# Patient Record
Sex: FEMALE | Race: WHITE
[De-identification: names, ages, dates, MRNs, and addresses within clinical notes are randomized per-mention and may not be internally consistent; named-entity substitution may affect disease eponyms.]

---

## 2019-08-18 ENCOUNTER — HOSPITAL ENCOUNTER (OUTPATIENT)
Dept: HOSPITAL 89 - ER | Age: 64
End: 2019-08-18
Attending: SURGERY
Payer: COMMERCIAL

## 2019-08-18 VITALS — DIASTOLIC BLOOD PRESSURE: 58 MMHG | SYSTOLIC BLOOD PRESSURE: 111 MMHG

## 2019-08-18 VITALS — DIASTOLIC BLOOD PRESSURE: 56 MMHG | SYSTOLIC BLOOD PRESSURE: 102 MMHG

## 2019-08-18 VITALS — DIASTOLIC BLOOD PRESSURE: 62 MMHG | SYSTOLIC BLOOD PRESSURE: 127 MMHG

## 2019-08-18 VITALS — SYSTOLIC BLOOD PRESSURE: 106 MMHG | DIASTOLIC BLOOD PRESSURE: 61 MMHG

## 2019-08-18 VITALS — DIASTOLIC BLOOD PRESSURE: 57 MMHG | SYSTOLIC BLOOD PRESSURE: 108 MMHG

## 2019-08-18 VITALS — SYSTOLIC BLOOD PRESSURE: 118 MMHG | DIASTOLIC BLOOD PRESSURE: 55 MMHG

## 2019-08-18 VITALS — SYSTOLIC BLOOD PRESSURE: 105 MMHG | DIASTOLIC BLOOD PRESSURE: 56 MMHG

## 2019-08-18 VITALS — SYSTOLIC BLOOD PRESSURE: 98 MMHG | DIASTOLIC BLOOD PRESSURE: 51 MMHG

## 2019-08-18 DIAGNOSIS — K80.10: Primary | ICD-10-CM

## 2019-08-18 LAB — PLATELET COUNT, AUTOMATED: 285 K/UL (ref 150–450)

## 2019-08-18 PROCEDURE — 82150 ASSAY OF AMYLASE: CPT

## 2019-08-18 PROCEDURE — 84075 ASSAY ALKALINE PHOSPHATASE: CPT

## 2019-08-18 PROCEDURE — 82947 ASSAY GLUCOSE BLOOD QUANT: CPT

## 2019-08-18 PROCEDURE — 74300 X-RAY BILE DUCTS/PANCREAS: CPT

## 2019-08-18 PROCEDURE — 84295 ASSAY OF SERUM SODIUM: CPT

## 2019-08-18 PROCEDURE — 84520 ASSAY OF UREA NITROGEN: CPT

## 2019-08-18 PROCEDURE — 86677 HELICOBACTER PYLORI ANTIBODY: CPT

## 2019-08-18 PROCEDURE — 82374 ASSAY BLOOD CARBON DIOXIDE: CPT

## 2019-08-18 PROCEDURE — 47563 LAPARO CHOLECYSTECTOMY/GRAPH: CPT

## 2019-08-18 PROCEDURE — 93005 ELECTROCARDIOGRAM TRACING: CPT

## 2019-08-18 PROCEDURE — 76705 ECHO EXAM OF ABDOMEN: CPT

## 2019-08-18 PROCEDURE — 84132 ASSAY OF SERUM POTASSIUM: CPT

## 2019-08-18 PROCEDURE — 82565 ASSAY OF CREATININE: CPT

## 2019-08-18 PROCEDURE — 83690 ASSAY OF LIPASE: CPT

## 2019-08-18 PROCEDURE — 82040 ASSAY OF SERUM ALBUMIN: CPT

## 2019-08-18 PROCEDURE — 88304 TISSUE EXAM BY PATHOLOGIST: CPT

## 2019-08-18 PROCEDURE — 81001 URINALYSIS AUTO W/SCOPE: CPT

## 2019-08-18 PROCEDURE — 84484 ASSAY OF TROPONIN QUANT: CPT

## 2019-08-18 PROCEDURE — 82310 ASSAY OF CALCIUM: CPT

## 2019-08-18 PROCEDURE — 84450 TRANSFERASE (AST) (SGOT): CPT

## 2019-08-18 PROCEDURE — 82435 ASSAY OF BLOOD CHLORIDE: CPT

## 2019-08-18 PROCEDURE — 82247 BILIRUBIN TOTAL: CPT

## 2019-08-18 PROCEDURE — 85025 COMPLETE CBC W/AUTO DIFF WBC: CPT

## 2019-08-18 PROCEDURE — 84155 ASSAY OF PROTEIN SERUM: CPT

## 2019-08-18 PROCEDURE — 74177 CT ABD & PELVIS W/CONTRAST: CPT

## 2019-08-18 PROCEDURE — 84460 ALANINE AMINO (ALT) (SGPT): CPT

## 2019-08-18 NOTE — POST OPERATIVE PROGRESS NOTE
Post Operative Progress Note


Date:  Aug 18, 2019


Time:  15:08


Surgeon:  


Ullrich





Dictation number:  [id number]


Anesthesia:  


GETA by Dr. Quiros


Pre-Op Diagnosis:  


Symptomatic cholelithiasis with unremitting biliary colic


Post-Op Diagnosis:  


STAN


Findings:  


C/W dx, normal IOC


Procedure(s):  


Lap angel with cholangiogram


Specimen Removed:(May be N/A):  


GB and contents


Complications:  


None


Fluids:  


See anesthesia record


Estimated Blood Loss:  


Minimal


Date OP Note Dictated:  Aug 18, 2019


Time OP Note Dictated:  15:09











ULLRICH,JOHN A MD              Aug 18, 2019 15:19

## 2019-08-18 NOTE — EKG
FACILITY: [facility] [place] 

 

PATIENT NAME: MILLICENT WILLIS

: [id number]

MR: [id number]

V: [id number]

EXAM DATE: 

ORDERING PHYSICIAN: SIDRA CONROY

TECHNOLOGIST: EDWINA

 

Test Reason : NAUSEA

Blood Pressure : ***/*** mmHG

Vent. Rate : 063 BPM     Atrial Rate : 063 BPM

   P-R Int : 172 ms          QRS Dur : 080 ms

    QT Int : 446 ms       P-R-T Axes : 011 050 027 degrees

   QTc Int : 456 ms

 

Normal sinus rhythm

Normal ECG

No previous ECGs available

Confirmed by ANIKA MCMULLEN ([id number]) on 2019 6:40:38 AM

 

Referred By:             Confirmed By:ANIKA MCMULLEN

## 2019-08-18 NOTE — ER REPORT
History and Physical


Time Seen By MD:  05:46


 (SIDRA CONROY DO)


HPI/ROS


CHIEF COMPLAINT: Abdominal pain





HISTORY OF PRESENT ILLNESS: 64-year-old female presents ambulatory to the ER 


complaining of severe epigastric pain, onset 1 hour prior to arrival.  Patient 


admits to vomiting several times on the way over in the car.  Patient status 


post vaginal hysterectomy.  Patient notes no dysuria, no frequency, no fever or 


chills.  No change in her bowel habits.  Patient reports she did have distant 


history of problems with her gallbladder and was advised to have her gallbladder


removed.  She describes it as shrunken and contracted.  She never did follow-up.


 She thinks his pain might be similar.





REVIEW OF SYSTEMS:


Respiratory: No cough, no dyspnea.


Cardiovascular: No chest pain, no palpitations.


Gastrointestinal: As above


Musculoskeletal: No back pain.


 (SIDRA CONROY DO)


Allergies:  


Coded Allergies:  


     No Known Drug Allergies (Verified , 8/6/10)


Home Meds


Active Scripts


Docusate Sodium (COLACE) 100 Mg Capsule, 1 CAP PO BID, #30 CAP 0 Refills


   TAKE WITH A FULL GLASS OF WATER


   Prov:ULLRICH,JOHN A MD         19


Tramadol Hcl (TRAMADOL HCL) 50 Mg Tablet, 1 TAB PO Q4H PRN for PAIN, #20 TAB 0 


Refills


   Prov:ULLRICH,JOHN A MD         19


Reviewed Nurses Notes:  Yes


Old Medical Records Reviewed:  Yes


 (SIDRA CONROY DO)


Hx Substance Use Disorder:  No


Hx Alcohol Use:  No


 (SIDRA CONROY DO)


Constitutional





Vital Sign - Last 24 Hours








 19





 05:53 05:55 06:00 06:19


 


Temp  98.1  


 


Pulse  71  57


 


Resp  18  


 


B/P (MAP) 137/79 (98) 137/79 112/62 (79) 


 


Pulse Ox  94  100


 


O2 Delivery  Room Air  


 


    





 19





 06:30 06:35 06:55 07:00


 


Pulse  75 64 


 


B/P (MAP) 125/78 (94)   118/69 (85)


 


Pulse Ox  100 91 





 19





 07:15 07:30 07:35 07:55


 


Pulse 62  63 64


 


B/P (MAP)  109/60 (76)  


 


Pulse Ox 97  97 96





 19   





 08:00   


 


B/P (MAP) 123/66 (85)   





 (COURTNEY ARRIAGA MD)


Physical Exam


  General Appearance: The patient is alert, has no immediate need for airway 


protection and no current signs of toxicity.  Vital signs stable, afebrile, 


pulse ox normal, cool, pale, dry skin


Eyes: Pupils equal and round no injection.


Respiratory: Chest is non tender, lungs are clear to auscultation.


Cardiac: regular rate and rhythm


Gastrointestinal: Abdomen is soft, mild epigastric and right upper quadrant 


tenderness, positive Falcon sign no masses, bowel sounds normal.


Musculoskeletal:  Neck: Neck is supple and non tender.


Extremities have full range of motion and are non tender.


Skin: No rashes or lesions.





DIFFERENTIAL DIAGNOSIS: After history and physical exam differential diagnosis 


was considered for abdominal pain including but not limited to appendicitis, 


cholecystitis, gastritis and urinary tract infection.


 (SIDRA CONROY DO)





Medical Decision Making


Data Points


Result Diagram:  


19 0600                                                                    


           19 0600





Laboratory





Hematology








Test


 19


06:00


 


White Blood Count


 5.2 k/uL


(4.5-11.0)


 


Red Blood Count


 4.96 M/uL


(4.17-5.56)


 


Hemoglobin


 14.6 g/dL


(12.0-16.0)


 


Hematocrit


 42.7 %


(34.0-47.0)


 


Mean Corpuscular Volume


 86.1 fL


(80.0-96.0)


 


Mean Corpuscular Hemoglobin


 29.5 pg


(26.0-33.0)


 


Mean Corpuscular Hemoglobin


Concent 34.3 g/dL


(32.0-36.0)


 


Red Cell Distribution Width


 13.6 %


(11.5-14.5)


 


Platelet Count


 285 K/uL


(150-450)


 


Mean Platelet Volume


 7.1 fL


(7.2-11.1)  L


 


Neutrophils (%) (Auto)


 52.4 %


(39.4-72.5)


 


Lymphocytes (%) (Auto)


 35.1 %


(17.6-49.6)


 


Monocytes (%) (Auto)


 6.7 %


(4.1-12.4)


 


Eosinophils (%) (Auto)


 4.6 %


(0.4-6.7)


 


Basophils (%) (Auto)


 1.2 %


(0.3-1.4)


 


Nucleated RBC Relative Count


(auto) 0.1 /100WBC  





 


Neutrophils # (Auto)


 2.7 K/uL


(2.0-7.4)


 


Lymphocytes # (Auto)


 1.8 K/uL


(1.3-3.6)


 


Monocytes # (Auto)


 0.3 K/uL


(0.3-1.0)


 


Eosinophils # (Auto)


 0.2 K/uL


(0.0-0.5)


 


Basophils # (Auto)


 0.1 K/uL


(0.0-0.1)


 


Nucleated RBC Absolute Count


(auto) 0.00 K/uL  











Chemistry








Test


 19


00:00 19


06:00


 


Troponin I < 0.012 ng/ml  


 


Sodium Level


 


 138 mmol/L


(137-145)


 


Potassium Level


 


 3.5 mmol/L


(3.5-5.0)


 


Chloride Level


 


 104 mmol/L


()


 


Carbon Dioxide Level


 


 24 mmol/L


(22-31)


 


Blood Urea Nitrogen


 


 13 mg/dl


(7-18)


 


Creatinine


 


 0.80 mg/dl


(0.52-1.04)


 


Glomerular Filtration Rate


Calc 


 > 60.0 





 


Random Glucose


 


 153 mg/dl


()


 


Calcium Level


 


 9.5 mg/dl


(8.4-10.2)


 


Total Bilirubin


 


 0.5 mg/dl


(0.2-1.3)


 


Aspartate Amino Transf


(AST/SGOT) 


 28 U/L (0-35) 





 


Alanine Aminotransferase


(ALT/SGPT) 


 43 U/L (0-56) 





 


Alkaline Phosphatase  71 U/L (0-126) 


 


Total Protein


 


 7.8 g/dl


(6.3-8.2)


 


Albumin


 


 4.3 g/dl


(3.5-5.0)


 


Amylase Level  72 U/L (0-110) 


 


Lipase


 


 122 U/L


()








Serology








Test


 19


00:00


 


Helicobacter pylori IgG


Antibody Negative


(NEGATIVE)








Urinalysis








Test


 19


05:52


 


Urine Color Yellow 


 


Urine Clarity Clear 


 


Urine pH


 5.0 pH


(4.8-9.5)


 


Urine Specific Gravity 1.014 


 


Urine Protein


 Negative mg/dL


(NEGATIVE)


 


Urine Glucose (UA)


 Negative mg/dL


(NEGATIVE)


 


Urine Ketones


 Negative mg/dL


(NEGATIVE)


 


Urine Blood


 Small


(NEGATIVE)


 


Urine Nitrite


 Negative


(NEGATIVE)


 


Urine Bilirubin


 Negative


(NEGATIVE)


 


Urine Urobilinogen


 Negative mg/dL


(0.2-1.9)


 


Urine Leukocyte Esterase


 Trace


(NEGATIVE)


 


Urine RBC


 1 /HPF


(0-2/HPF)


 


Urine WBC


 2 /HPF


(0-5/HPF)


 


Urine Squamous Epithelial


Cells Few /LPF


(</=FEW)


 


Urine Bacteria


 Negative /HPF


(NONE-FEW)


 


Urine Mucus


 Few /HPF


(NONE-FEW)





 (COURTNEY ARRIAGA MD)





EKG/Imaging


EKG Interpretation


12 lead EK


      Rhythm: normal sinus rhythm


      Axis: normal 


      QRS: normal


      ST segments: normal, no evidence of ischemia or dysrhythmia, no previous 


EKGs for comparison


      


 (SIDRA CONROY DO)


Imaging


FACILITY: [facility] 


 


PATIENT NAME: Isamar Plascencia


: 1955


MR: [id number]


V: [id number]


EXAM DATE: 


ORDERING PHYSICIAN: SIDRA CONROY


TECHNOLOGIST: 


 


Location: [facility] [place]


Patient: Isamar Plascencia


: 1955


MRN: [id number]


Visit/Account:[id number]


Date of Sevice:  2019


 


ACCESSION #: [id number]


 


GALLBLADDER


 


HISTORY:  Right upper quadrant pain


 


COMPARISON:  None.


 


FINDINGS:


 


Gallbladder: There are a few mobile shadowing gallstones. No gallbladder wall 


thickening. Positive sonographic Falcon's sign..


Bile ducts: There is no biliary ductal dilation with the CBD measuring 4 mm.


Liver: Negative.


Pancreas: Negative.


Right kidney: Negative.


Upper abdominal aorta and IVC: Patent.


Ascites: None visualized.


Other findings: None significant


 


IMPRESSION:


 


1. There are a few multiple shadowing gallstones. Positive sonographic Falcon's 


sign although there is no gallbladder wall thickening.


2. Negative for biliary ductal dilatation.


 


Report Dictated By: Tobias Martell MD at 2019 8:22 AM


 


Report E-Signed By: Tobias Martell MD  at 2019 8:26 AM


 


WSN:[id number]





FACILITY: [facility] 


 


PATIENT NAME: Isamar Plascencia


: 1955


MR: [id number]


V: [id number]


EXAM DATE: [id number]


ORDERING PHYSICIAN: COURTNEY ARRIAGA


TECHNOLOGIST: 


 


Location: [facility] [place]


Patient: Isamar Plascencia


: 1955


MRN: [id number]


Visit/Account:[id number]


Date of Sevice:  2019


 


ACCESSION #: [id number]


 


CT ABDOMEN PELVIS W/ CON


 


HISTORY:  epigastric and RUQ pain


 


TECHNIQUE:  Axial images were obtained through the abdomen and pelvis with 


intravenous contrast . One of the following dose optimization techniques was 


utilized in the performance of this exam: automated exposure control; adjustment


of the mA and/or kv according to patient size; or use of iterative 


reconstruction technique. Specific details can be referenced in the facility's 


radiology CT exam operational policy.


 


CONTRAST:  75 cc of Isovue-370


 


COMPARISON:  Ultrasound 2019


 


FINDINGS:


 


Visualized lung bases:  Mild dependent bibasilar opacities, likely atelectasis.


Hepatobiliary:  Gallstones. Mild enhancement of the gallbladder wall. Common 


bile duct measures 6-7 mm with mild tapering towards the ampulla.


Spleen:  Negative.


Adrenals:  Negative.


Pancreas:  Negative.


Kidneys/ureters/bladder:  3 mm right lower pole simple renal cysts. No 


hydronephrosis.


Bowel/peritoneum/mesentery:  Unusual segments of small bowel within the left 


upper quadrant with some fluid-filled and some containing food material 


measuring up to 2.5 cm. Minimal edema within the adjacent fat.


Vessels:  Negative.


Lymph nodes: Negative.


Pelvic genitourinary: Hysterectomy.


Bones/body wall:  2 mm anterolisthesis from L4-L5 with mild facet arthropathy..


Other findings: None significant


 


IMPRESSION:


 


1. Gallstones with mild enhancement of the gallbladder wall. There is a positive


sonographic Falcon's sign on earlier ultrasound. Recommend clinical correlation 


for cholecystitis. Common bile duct measures 6-7 mm with tapering towards 


ampulla.


2. Unusual appearing small bowel loops within the left upper quadrant measuring 


up to 2.5 cm with minimal edema within the adjacent fat without discrete 


obstructive features which can be seen with enteritis.


 


Report Dictated By: Tobias Martell MD at 2019 9:03 AM


 


Report E-Signed By: Tobias Martell MD  at 2019 9:20 AM


 


WSN:[id number]





 (COURTNEY ARRIAGA MD)


ED Course/Re-evaluation


Clinical Indication for ER IV:  Hydration, IV Access


ED Course


Care turned over to Dr. Llanos at shift change with diagnostic ultrasound of 


the right upper quadrant or CT scan abdomen and pelvis with IV contrast pending.


 The testis been ordered, but the ultrasound tech was unavailable.  Patient's 


pain was under control.  Her laboratory studies were unremarkable.


Decision to Disposition Date:  Aug 18, 2019


Decision to Disposition Time:  06:09


 (SIDRA CONROY DO)


ED Course


 2019 11:41:46 am patient with likely atypical presentation of acute 


cholecystitis based on presentation patient with persistent pain. Case was 


discussed with the on-call surgeon Dr. Ullrich; he would like us to start 


antibiotics keep the patient nothing by mouth and she will be going to the OR 


later this morning. Patient was made aware and has no questions or concerns at 


time of disposition.





 2019 12:26:02 pm Dr. Fox has now come to see the patient. I thought 


process is perhaps this is more of a gastritis type picture he would like us to 


have the fentanyl that the patient just got wear off and try GI cocktail if this


seems to help we will discharge her on proton pump inhibitor. However if this 


does not help her pain Dr. Fox will consider taking her gallbladder out.


Decision to Disposition Date:  Aug 18, 2019


Turned Over


Accepted care of patient at 0700; labs normal, except for slightly elevated 


glucose, but doubt this is the cause of patient's symptoms. Awaiting ultrasound,


patients pain is currently undercontrol.


 (COURTNEY ARRIAGA MD)





Depart


Departure


Latest Vital Signs





Vital Signs








  Date Time  Temp Pulse Resp B/P (MAP) Pulse Ox O2 Delivery O2 Flow Rate FiO2


 


19 08:00    123/66 (85)    


 


19 07:55  64   96   


 


19 05:55 98.1  18   Room Air  





 (COURTNEY ARRIAGA MD)


Impression:  


   Primary Impression:  


   Acute cholecystitis


Condition:  Improved


Disposition:  ADMIT FROM ER TO OR (To Dr Ullrich)


New Scripts


Docusate Sodium (COLACE) 100 Mg Capsule


1 CAP PO BID, #30 CAP 0 Refills


   TAKE WITH A FULL GLASS OF WATER


   Prov: ULLRICH,JOHN A MD         19 


Tramadol Hcl (TRAMADOL HCL) 50 Mg Tablet


1 TAB PO Q4H PRN for PAIN, #20 TAB 0 Refills


   Prov: ULLRICH,JOHN A MD         19











SIDRA CONROY DO              Aug 18, 2019 05:50


COURTNEY ARRIAGA MD             Aug 18, 2019 07:20

## 2019-08-18 NOTE — RADIOLOGY IMAGING REPORT
FACILITY: [facility] 

 

PATIENT NAME: Isamar Plascencia

: 1955

MR: [id number]

V: [id number]

EXAM DATE: 

ORDERING PHYSICIAN: COURTNEY ARRIAGA

TECHNOLOGIST: 

 

Location: [facility]

Patient: Isamar Plascencia

: 1955

MRN: [id number]

Visit/Account:[id number]

Date of Sevice:  2019

 

ACCESSION #: [id number]

 

CT ABDOMEN PELVIS W/ CON

 

HISTORY:  epigastric and RUQ pain

 

TECHNIQUE:  Axial images were obtained through the abdomen and pelvis with intravenous contrast . One
 of the following dose optimization techniques was utilized in the performance of this exam: automate
d exposure control; adjustment of the mA and/or kv according to patient size; or use of iterative rec
onstruction technique. Specific details can be referenced in the facility's radiology CT exam operati
onal policy.

 

CONTRAST:  75 cc of Isovue-370

 

COMPARISON:  Ultrasound 2019

 

FINDINGS:

 

Visualized lung bases:  Mild dependent bibasilar opacities, likely atelectasis.

Hepatobiliary:  Gallstones. Mild enhancement of the gallbladder wall. Common bile duct measures 6-7 m
m with mild tapering towards the ampulla.

Spleen:  Negative.

Adrenals:  Negative.

Pancreas:  Negative.

Kidneys/ureters/bladder:  3 mm right lower pole simple renal cysts. No hydronephrosis.

Bowel/peritoneum/mesentery:  Unusual segments of small bowel within the left upper quadrant with some
 fluid-filled and some containing food material measuring up to 2.5 cm. Minimal edema within the manjula
cent fat.

Vessels:  Negative.

Lymph nodes: Negative.

Pelvic genitourinary: Hysterectomy.

Bones/body wall:  2 mm anterolisthesis from L4-L5 with mild facet arthropathy..

Other findings: None significant

 

IMPRESSION:

 

1. Gallstones with mild enhancement of the gallbladder wall. There is a positive sonographic Falcon's
 sign on earlier ultrasound. Recommend clinical correlation for cholecystitis. Common bile duct measu
res 6-7 mm with tapering towards ampulla.

2. Unusual appearing small bowel loops within the left upper quadrant measuring up to 2.5 cm with min
imal edema within the adjacent fat without discrete obstructive features which can be seen with enter
itis.

 

Report Dictated By: Tobias Martell MD at 2019 9:03 AM

 

Report E-Signed By: Tobias Martell MD  at 2019 9:20 AM

 

WSN:[id number]

## 2019-08-18 NOTE — RADIOLOGY IMAGING REPORT
FACILITY: [facility] [place] 

 

PATIENT NAME: Isamar Plascencia

: 1955

MR: [id number]

V: [id number]

EXAM DATE: 

ORDERING PHYSICIAN: SIDRA CONROY

TECHNOLOGIST: 

 

Location: [facility]

Patient: Isamar Plascencia

: 1955

MRN: [id number]

Visit/Account:[id number]

Date of Sevice:  2019

 

ACCESSION #: [id number]

 

GALLBLADDER

 

HISTORY:  Right upper quadrant pain

 

COMPARISON:  None.

 

FINDINGS:

 

Gallbladder: There are a few mobile shadowing gallstones. No gallbladder wall thickening. Positive so
nographic Falcon's sign..

Bile ducts: There is no biliary ductal dilation with the CBD measuring 4 mm.

Liver: Negative.

Pancreas: Negative.

Right kidney: Negative.

Upper abdominal aorta and IVC: Patent.

Ascites: None visualized.

Other findings: None significant

 

IMPRESSION:

 

1. There are a few multiple shadowing gallstones. Positive sonographic Falcon's sign although there i
s no gallbladder wall thickening.

2. Negative for biliary ductal dilatation.

 

Report Dictated By: Tobias Martell MD at 2019 8:22 AM

 

Report E-Signed By: Tobias Martell MD  at 2019 8:26 AM

 

WSN:[id number]

## 2019-08-18 NOTE — GEN SURGERY HISTORY & PHYSICAL
History of Present Illness


Chief Complaint


Epigastric abdominal pain


History of Present Illness


64-year-old female comes into the emergency room with upper abdominal pain 


mainly in her epigastrium that started at about 4:00 this morning, almost 10 


hours ago from the time of this note. She reports she has had intermittent upper


abdominal symptoms and she has even been told that she's had gallbladder issues 


starting even as long as 20 years ago but she didn't want to have her 


gallbladder out and so she has just been avoiding triggers which includes 


coffee. She reports even though she knows it is a trigger she does drink coffee 


daily. She's had nausea but no emesis. No fevers or chills, no constipation or 


diarrhea. She had a vaginal hysterectomy but no abdominal surgeries other than 


this. We tried a GI cocktail in the emergency room since her pain is in the 


epigastrium to see if it resolves but it did not and so she is asking to have 


her gallbladder removed.





History


Allergies:  


Coded Allergies:  


     No Known Drug Allergies (Verified , 8/6/10)





Review of Systems


All Systems Reviewed/Normal:  Yes, Except as Noted


Gastrointestinal:  Nausea, Abdominal Pain





Exam


General Appearance:  Alert, Awake, No Acute Distress, Afebrile


Neuro:  No Gross deficits


Eyes:  PERRLA


GI:  Other (epigastric tenderness to palpation)


Extremities:  Warm, Perfused


Psych:  Alert & Oriented X3, Appropriate Mood & Affect





Medical Decision Making


Data Points


Result Diagram:  


8/18/19 0600                                                                    


           8/18/19 0600








Assessment and Plan


Problems:  


(1) Acute cholecystitis


Status:  Acute


Assessment & Plan:  8/18/19: We'll admit the patient, start IV antibiotics, 


start IV fluids, and plan on laparoscopic cholecystectomy today. I have e


xplained this plan including the surgery in great detail along with the 


alternatives, and the risks. I have emphasized the risks of potential common 


duct injury, duodenal or colon injury, small bowel injury, bile leak, or need to


convert to an open surgery. I've also emphasized that given the relative normal 


appearance of her gallbladder on ultrasound other than the presence of 


gallstones there is a chance she could have persistent symptoms after surgery if


the gallbladder is not the cause of her symptoms. Since the GI cocktail did not 


lead to any improvement in her symptoms and since her symptoms are unremitting, 


I recommended cholecystectomy and she seems agreeable with this. She indicates 


her understanding of this discussion and her questions have been answered. She 


would like to proceed with this plan including laparoscopic cholecystectomy.





Condition


Stable


Time Spent:  < 30 min





Venous Thromboembolism


VTE Risk


Physician Assess for VTE Risk:  Yes


Patient's VTE Risk:  Low





VTE Diagnostic Test


2 Days Prior to Admit:  No





Antithrombotics


Is Pt On Any Antithrombotics?:  No











ULLRICH,JOHN A MD              Aug 18, 2019 13:56

## 2019-08-18 NOTE — SHORT(OUTPT) DISCHARGE SUMMARY
Discharge Summary


Reason for Hosp/Final Diag:  


(1) Acute cholecystitis


Status:  Acute


Hospital Course & Plan:  8/18/19: We'll admit the patient, start IV antibiotics,


start IV fluids, and plan on laparoscopic cholecystectomy today. I have 


explained this plan including the surgery in great detail along with the 


alternatives, and the risks. I have emphasized the risks of potential common 


duct injury, duodenal or colon injury, small bowel injury, bile leak, or need to


convert to an open surgery. I've also emphasized that given the relative normal 


appearance of her gallbladder on ultrasound other than the presence of 


gallstones there is a chance she could have persistent symptoms after surgery if


the gallbladder is not the cause of her symptoms. Since the GI cocktail did not 


lead to any improvement in her symptoms and since her symptoms are unremitting, 


I recommended cholecystectomy and she seems agreeable with this. She indicates 


her understanding of this discussion and her questions have been answered. She 


would like to proceed with this plan including laparoscopic cholecystectomy.





8/18/19 (postop):  Lap angel completed without problems.  Pt tolerated the 


procedure without issues.  Will d/c to home from PACU.





Departure


Discharge to:  Home, Self Care





Discharge Instructions


Home Meds


Active Scripts


Docusate Sodium (COLACE) 100 Mg Capsule, 1 CAP PO BID, #30 CAP 0 Refills


   TAKE WITH A FULL GLASS OF WATER


   Prov:ULLRICH,JOHN A MD         8/18/19


Tramadol Hcl (TRAMADOL HCL) 50 Mg Tablet, 1 TAB PO Q4H PRN for PAIN, #20 TAB 0 


Refills


   Prov:ULLRICH,JOHN A MD         8/18/19


Follow up Referrals:  


General Surgery - 09/09/19 @ Surgery, General with ULLRICH,JOHN A MD You have a 


follow up appointment scheduled with Dr. Ullrich on Monday, 9/9/19, at 4:30pm.





Diet:  Regular


Activity:  As Tolerated


Special Instructions:  


You may remove the white surgical dressings on Tuesday, 8/20/19, then you


can shower.  After showering, leave the incisions open to air but leave


the steristrips in place until they fall off on their own.  Do not immerse


the incisions for 2 weeks.  Avoid any activity that involves straining or


lifting more than 10 pounds for 1 week after surgery.  No driving while


taking the tramadol pain pills.











ULLRICH,JOHN A MD              Aug 18, 2019 15:12

## 2019-08-19 NOTE — RADIOLOGY IMAGING REPORT
FACILITY: [facility] [place] 

 

PATIENT NAME: Isamar Plascencia

: 1955

MR: [id number]

V: [id number]

EXAM DATE: 

ORDERING PHYSICIAN: JOHN ULLRICH

TECHNOLOGIST: 

 

[place] [facility] [place] Isamar Plascencia

: 1955

MRN: [id number]

Visit/Account:[id number]

Date of Sevice:  2019

 

ACCESSION #: [id number]

 

CHOLANGIOGRAM OPERATIVE

 

INDICATION: Cholecystitis. Intraoperative cholangiogram.

 

PROCEDURE: Fluoroscopic guidance was provided for Dr. Ullrich.

 

FLUOROSCOPY DOSE: 1.3823 mGy cumulative dose (Ka,r)/air Kerma

 

 

FINDINGS: Initial image demonstrates biliary cannulation with subsequent opacification of the common 
duct. There are 2 small filling defects in the common duct that may be tiny stones versus air bubbles
. No intrahepatic ductal dilation. Pancreatic duct is partially opacified. Please see the operative r
eport that is dictated separately.

 

IMPRESSION: Fluoroscopy was provided.

 

Report Dictated By: Selma Garces at 2019 7:13 AM

 

Report E-Signed By: Selma Garces  at 2019 7:15 AM

 

WSN:[id number]

## 2019-08-19 NOTE — OPERATIVE REPORT 1
EVENT DATE: August 18, 2019 

SURGEON: John A. Ullrich, MD 

ANESTHESIOLOGIST: Karlos Quiros MD 

ANESTHESIA: General endotracheal anesthesia.





PREOPERATIVE DIAGNOSIS  

Symptomatic cholelithiasis with unremitting biliary colic.  



POSTOPERATIVE DIAGNOSIS 

Symptomatic cholelithiasis with unremitting biliary colic.  



PROCEDURE PERFORMED 

Laparoscopic cholecystectomy with intraoperative cholangiogram.  



COMPLICATIONS 

None.  



CONDITION 

Stable.  



BLOOD LOSS 

Minimal.  



INDICATIONS FOR PROCEDURE

This is a 64-year-old female who presented to the emergency room with 

unremitting epigastric pain that started at 4 this morning and failed to get 

better with any intervention other than fentanyl.  A GI cocktail was attempted, 

but this failed to improve her symptoms either.  She was also having associated 

nausea.  Right upper quadrant ultrasound revealed gallstones but no obvious 

inflammation.  Labs were all normal.  Discussed her options with her, and 

ultimately she decided to proceed with cholecystectomy.  



DESCRIPTION OF PROCEDURE 

The patient was brought to the operating room and placed supine on the operating

table.  General endotracheal anesthesia was administered, and her abdomen was 

prepped and draped in a sterile fashion.  A time-out was completed, and I 

injected the infraumbilical skin with 0.5% ropivacaine plain.  I made a 

curvilinear smiley-face incision in the infraumbilical rim and dissected through

the dermis and subcutaneous fat.  I identified the midline fascia and made a 

vertical incision in the midline fascia, grasped the fascial edges with Kocher 

clamps and retracted the fascia toward the ceiling.  I then bluntly entered the 

peritoneal cavity with my finger, placed two interrupted 0 Vicryl sutures 

transversely through the vertical fascial defect, and inserted a 12 mm Nicolas-

type port through this wound and secured it in place with the sutures.  I 

insufflated the abdomen to a pressure of 15 mmHg and inserted a 5 mm, 

30-degree-angled scope through this port.  



Next, under direct visualization I placed an epigastric midline 5 mm port and 

two right upper quadrant 5 mm ports.  I then had the patient placed in reverse 

Trendelenburg and planed toward her left to move the viscera from the right 

upper quadrant.  I then identified the gallbladder and retracted the fundus 

toward the patient's right shoulder and retracted the infundibulum toward the 

patient's right.  The gallbladder did appear somewhat fibrotic but did not 

necessarily appear acutely inflamed.  I divided the peritoneum overlying the 

infundibulum in both the medial and lateral aspects of the gallbladder.  I then 

stripped the peritoneum and subperitoneal contents down from the infundibulum 

and around the cystic duct and arteries.  The cystic duct and arteries were 

easily identified, and I dissected around each of these, cleaning them off, and 

then clipped the artery proximally and distally with Endoclips, and then clipped

the duct at the infundibulocystic duct junction with a single Endoclip.  I made 

a ductotomy just distal to the clip.  I then milked the duct from distal to 

proximal with a [place] dissector, and there was no debris or stones in the 

cystic duct.  



I then completed a cholangiogram, which revealed that I was in the cystic duct 

away from the common duct structures, and I was able to identify the common 

hepatic duct, common bile duct, and intrahepatic biliary system.  I observed 

contrast flowing into the duodenum.  I also identified the pancreatic duct, 

which also filled without problems.  After cholangiogram was completed, I 

removed the cholangiocatheter and clipped the duct distal to the ductotomy with 

three clips, but well away from the common duct, which was easily visible during

the surgery due to the patient's thin body habitus.  I then divided the duct and

artery between clips and then divided the posterior attachment to the 

gallbladder,  it from the gallbladder fossa, and then placed the 

gallbladder in a surgical specimen retrieval bag and removed it from the abdomen

through the umbilical port site.  



I then irrigated and dried the right upper quadrant.  There was a small bit of 

oozing from the gallbladder fossa, and this was controlled with electrocautery. 

I watched this for a while, and there was no further bleeding.  The cystic duct 

and artery stumps were secured with clips which were in good position, and there

was no bile leak or bleeding from the cystic duct or artery stumps or the 

gallbladder fossa.  I removed all irrigation fluid from the right upper 

quadrant.  I then removed the 5 mm ports, desufflated the abdomen, removed from 

the camera followed by the umbilical port, and then closed the midline fascial 

incision with another figure-of-eight 0 Vicryl suture.  I then tied all three of

these down with good reapproximation of the fascial edges and no remaining 

fascial defect.  I then closed the skin at each port site with 4-0 Monocryl 

subcuticular sutures.  The skin was cleaned and dried and Steri-Strips were 

applied, followed by sterile surgical dressings.  



The patient was awakened and extubated in the operating room and transported to 

the recovery room in stable condition, having tolerated the procedure without 

apparent problems.  

JEANETTE